# Patient Record
Sex: FEMALE | Race: WHITE | NOT HISPANIC OR LATINO | URBAN - METROPOLITAN AREA
[De-identification: names, ages, dates, MRNs, and addresses within clinical notes are randomized per-mention and may not be internally consistent; named-entity substitution may affect disease eponyms.]

---

## 2020-08-12 NOTE — PATIENT DISCUSSION
EARLY Brenna OU - PT UNDERSTANDS VISION MAY CONTINUE TO CHANGE AS CATARACTS PROGRESS. PREFERS TO WAIT FOR CATARACTS TO RIPEN.

## 2020-08-12 NOTE — PATIENT DISCUSSION
MYOPIA, OU-  DISC THAT LASIK TARGETED FOR DVA WOULD RENDER PT COMPLETELY DEPENDENT ON  GLS FOR ALL INTERMEDIATE/NEAR VA. PT NOT INTERESTED IN MV. NON-CAND 2' EXPECTATIONS.

## 2022-05-31 ENCOUNTER — ESTABLISHED PATIENT (OUTPATIENT)
Dept: URBAN - METROPOLITAN AREA CLINIC 17 | Facility: CLINIC | Age: 77
End: 2022-05-31

## 2022-05-31 DIAGNOSIS — H40.033: ICD-10-CM

## 2022-05-31 DIAGNOSIS — H43.811: ICD-10-CM

## 2022-05-31 PROCEDURE — 92133 CPTRZD OPH DX IMG PST SGM ON: CPT

## 2022-05-31 PROCEDURE — 92014 COMPRE OPH EXAM EST PT 1/>: CPT

## 2022-05-31 ASSESSMENT — TONOMETRY
OD_IOP_MMHG: 15
OS_IOP_MMHG: 16

## 2022-05-31 ASSESSMENT — VISUAL ACUITY
OS_SC: 20/20
OD_SC: 20/25-1

## 2023-01-03 ENCOUNTER — FOLLOW UP (OUTPATIENT)
Dept: URBAN - METROPOLITAN AREA CLINIC 17 | Facility: CLINIC | Age: 78
End: 2023-01-03

## 2023-01-03 DIAGNOSIS — H40.033: ICD-10-CM

## 2023-01-03 PROCEDURE — 92012 INTRM OPH EXAM EST PATIENT: CPT

## 2023-01-03 ASSESSMENT — TONOMETRY
OS_IOP_MMHG: 16
OD_IOP_MMHG: 13

## 2023-01-03 ASSESSMENT — VISUAL ACUITY
OD_SC: 20/30
OS_SC: 20/20

## 2023-06-28 ENCOUNTER — ESTABLISHED PATIENT (OUTPATIENT)
Dept: URBAN - METROPOLITAN AREA CLINIC 17 | Facility: CLINIC | Age: 78
End: 2023-06-28

## 2023-06-28 DIAGNOSIS — H43.811: ICD-10-CM

## 2023-06-28 DIAGNOSIS — H40.033: ICD-10-CM

## 2023-06-28 PROCEDURE — 92014 COMPRE OPH EXAM EST PT 1/>: CPT

## 2023-06-28 PROCEDURE — 92133 CPTRZD OPH DX IMG PST SGM ON: CPT

## 2023-06-28 ASSESSMENT — TONOMETRY
OS_IOP_MMHG: 15
OD_IOP_MMHG: 12

## 2023-06-28 ASSESSMENT — VISUAL ACUITY
OS_SC: J10
OS_SC: 20/20-1
OD_SC: J1+
OU_SC: J1+
OU_SC: 20/20-1
OD_SC: 20/30-1

## 2024-09-16 ENCOUNTER — COMPREHENSIVE EXAM (OUTPATIENT)
Dept: URBAN - METROPOLITAN AREA CLINIC 17 | Facility: CLINIC | Age: 79
End: 2024-09-16

## 2024-09-16 DIAGNOSIS — H43.812: ICD-10-CM

## 2024-09-16 DIAGNOSIS — H40.033: ICD-10-CM

## 2024-09-16 PROCEDURE — 92014 COMPRE OPH EXAM EST PT 1/>: CPT

## 2024-09-16 PROCEDURE — 92133 CPTRZD OPH DX IMG PST SGM ON: CPT
